# Patient Record
Sex: FEMALE | Race: WHITE | NOT HISPANIC OR LATINO | Employment: FULL TIME | URBAN - METROPOLITAN AREA
[De-identification: names, ages, dates, MRNs, and addresses within clinical notes are randomized per-mention and may not be internally consistent; named-entity substitution may affect disease eponyms.]

---

## 2024-01-06 ENCOUNTER — OFFICE VISIT (OUTPATIENT)
Age: 59
End: 2024-01-06
Payer: COMMERCIAL

## 2024-01-06 VITALS
RESPIRATION RATE: 18 BRPM | OXYGEN SATURATION: 99 % | HEART RATE: 87 BPM | WEIGHT: 163 LBS | TEMPERATURE: 97.8 F | SYSTOLIC BLOOD PRESSURE: 110 MMHG | DIASTOLIC BLOOD PRESSURE: 80 MMHG

## 2024-01-06 DIAGNOSIS — B96.89 ACUTE BACTERIAL SINUSITIS: Primary | ICD-10-CM

## 2024-01-06 DIAGNOSIS — J01.90 ACUTE BACTERIAL SINUSITIS: Primary | ICD-10-CM

## 2024-01-06 PROBLEM — Z79.890 POSTMENOPAUSAL HORMONE REPLACEMENT THERAPY: Status: ACTIVE | Noted: 2023-10-20

## 2024-01-06 PROBLEM — F32.A DEPRESSIVE DISORDER: Status: ACTIVE | Noted: 2023-06-30

## 2024-01-06 PROBLEM — R01.1 MURMUR: Status: ACTIVE | Noted: 2023-10-22

## 2024-01-06 PROBLEM — K21.9 GASTROESOPHAGEAL REFLUX DISEASE WITHOUT ESOPHAGITIS: Status: ACTIVE | Noted: 2023-10-20

## 2024-01-06 PROBLEM — G89.29 CHRONIC MIDLINE LOW BACK PAIN: Status: ACTIVE | Noted: 2023-10-20

## 2024-01-06 PROBLEM — I49.3 FREQUENT PVCS: Status: ACTIVE | Noted: 2023-10-20

## 2024-01-06 PROBLEM — F41.0 GENERALIZED ANXIETY DISORDER WITH PANIC ATTACKS: Status: ACTIVE | Noted: 2023-10-20

## 2024-01-06 PROBLEM — F41.1 GENERALIZED ANXIETY DISORDER WITH PANIC ATTACKS: Status: ACTIVE | Noted: 2023-10-20

## 2024-01-06 PROBLEM — M54.50 CHRONIC MIDLINE LOW BACK PAIN: Status: ACTIVE | Noted: 2023-10-20

## 2024-01-06 PROCEDURE — G0382 LEV 3 HOSP TYPE B ED VISIT: HCPCS

## 2024-01-06 PROCEDURE — 99283 EMERGENCY DEPT VISIT LOW MDM: CPT

## 2024-01-06 RX ORDER — FAMOTIDINE 40 MG/1
40 TABLET, FILM COATED ORAL DAILY
COMMUNITY

## 2024-01-06 RX ORDER — PANTOPRAZOLE SODIUM 40 MG/1
40 TABLET, DELAYED RELEASE ORAL DAILY
COMMUNITY

## 2024-01-06 RX ORDER — LORAZEPAM 0.5 MG/1
0.5 TABLET ORAL
COMMUNITY
Start: 2023-10-20

## 2024-01-06 RX ORDER — AMOXICILLIN AND CLAVULANATE POTASSIUM 875; 125 MG/1; MG/1
1 TABLET, FILM COATED ORAL EVERY 12 HOURS SCHEDULED
Qty: 14 TABLET | Refills: 0 | Status: SHIPPED | OUTPATIENT
Start: 2024-01-06 | End: 2024-01-13

## 2024-01-06 RX ORDER — PREDNISONE 20 MG/1
20 TABLET ORAL 2 TIMES DAILY
Qty: 10 TABLET | Refills: 0 | Status: SHIPPED | OUTPATIENT
Start: 2024-01-06 | End: 2024-01-11

## 2024-01-06 RX ORDER — AMITRIPTYLINE HYDROCHLORIDE 50 MG/1
50 TABLET, FILM COATED ORAL
COMMUNITY
Start: 2023-12-11

## 2024-01-06 RX ORDER — CARBAMAZEPINE 200 MG/1
200 TABLET, EXTENDED RELEASE ORAL
COMMUNITY

## 2024-01-06 RX ORDER — PREGABALIN 25 MG/1
25 CAPSULE ORAL
COMMUNITY
Start: 2023-12-11

## 2024-01-06 RX ORDER — SERTRALINE HYDROCHLORIDE 100 MG/1
100 TABLET, FILM COATED ORAL DAILY
COMMUNITY

## 2024-01-06 RX ORDER — MELOXICAM 15 MG/1
15 TABLET ORAL DAILY
COMMUNITY

## 2024-01-06 RX ORDER — ESTRADIOL 2 MG/1
1 TABLET ORAL DAILY
COMMUNITY
Start: 2023-10-20

## 2024-01-06 NOTE — PROGRESS NOTES
[facility]        NAME: Courtney Anguiano is a 58 y.o. female  : 1965    MRN: [id number]  DATE: 2024  TIME: 12:38 PM    Assessment and Plan   Acute bacterial sinusitis [J01.90, B96.89]  1. Acute bacterial sinusitis  amoxicillin-clavulanate (AUGMENTIN) 875-125 mg per tablet    predniSONE 20 mg tablet            Patient Instructions   Take antibiotics and steroids as prescribed  For decongestion, Over The Counter medications:  Mucinex-DM   Nasal corticosteroid: examples are Flonase or Nasacort.  Nasal saline irrigation  Humidified air  Warm moist air such as running the shower for several minutes on warm/hot for the steam. Sit in bathroom for several minutes to take deep breaths.  Vicks Vapor Rub  For Cough or sore throat:  Salt water gurgle  Honey  Chloraseptic spray  Throat lozenges  Over the Counter Tylenol or Ibuprofen  Follow up with PCP in 3-5 days if symptoms not improving.  Proceed to ER if symptoms worsen.      Chief Complaint     Chief Complaint   Patient presents with   • Cough     Cough, nasal congestion, bilateral ear pressure X 2 days         History of Present Illness       Cough, nasal congestion with purulent drainage and sinus pain, bilateral ear pressure, and also chest congestion and tightness for 2 days.        Review of Systems   Review of Systems   HENT:  Positive for congestion, sinus pressure, sinus pain and sore throat.    Respiratory:  Positive for cough, chest tightness and shortness of breath.    Neurological:  Positive for headaches. Negative for dizziness and weakness.         Current Medications       Current Outpatient Medications:   •  amitriptyline (ELAVIL) 50 mg tablet, Take 50 mg by mouth, Disp: , Rfl:   •  amoxicillin-clavulanate (AUGMENTIN) 875-125 mg per tablet, Take 1 tablet by mouth every 12 (twelve) hours for 7 days, Disp: 14 tablet, Rfl: 0  •  estradiol (ESTRACE) 2 MG tablet, Take 1 mg by mouth daily, Disp: , Rfl:   •  LORazepam (ATIVAN) 0.5 mg  tablet, Take 0.5 mg by mouth, Disp: , Rfl:   •  predniSONE 20 mg tablet, Take 1 tablet (20 mg total) by mouth 2 (two) times a day for 5 days, Disp: 10 tablet, Rfl: 0  •  pregabalin (LYRICA) 25 mg capsule, Take 25 mg by mouth, Disp: , Rfl:   •  carBAMazepine (TEGretol XR) 200 mg 12 hr tablet, Take 200 mg by mouth, Disp: , Rfl:   •  famotidine (PEPCID) 40 MG tablet, Take 40 mg by mouth daily, Disp: , Rfl:   •  meloxicam (MOBIC) 15 mg tablet, Take 15 mg by mouth daily, Disp: , Rfl:   •  pantoprazole (PROTONIX) 40 mg tablet, Take 40 mg by mouth daily, Disp: , Rfl:   •  sertraline (ZOLOFT) 100 mg tablet, Take 100 mg by mouth daily, Disp: , Rfl:     Current Allergies     Allergies as of 01/06/2024 - Reviewed 01/06/2024   Allergen Reaction Noted   • Doxycycline Shortness Of Breath 12/06/2021   • Medical tape Shortness Of Breath 10/18/2023   • Bee venom Vomiting 10/18/2023   • Pollen extract Allergic Rhinitis 10/18/2023            The following portions of the patient's history were reviewed and updated as appropriate: allergies, current medications, past family history, past medical history, past social history, past surgical history and problem list.     History reviewed. No pertinent past medical history.    History reviewed. No pertinent surgical history.    History reviewed. No pertinent family history.      Medications have been verified.        Objective   /80 (BP Location: Right arm, Patient Position: Sitting, Cuff Size: Adult)   Pulse 87   Temp 97.8 °F (36.6 °C) (Oral)   Resp 18   Wt 73.9 kg (163 lb)   SpO2 99%   No LMP recorded. Patient has had a hysterectomy.       Physical Exam     Physical Exam  Vitals and nursing note reviewed.   Constitutional:       Appearance: Normal appearance.   HENT:      Head: Normocephalic and atraumatic.      Right Ear: A middle ear effusion is present.      Left Ear: A middle ear effusion is present.      Nose:      Right Sinus: Maxillary sinus tenderness and frontal sinus  tenderness present.      Left Sinus: Maxillary sinus tenderness and frontal sinus tenderness present.      Mouth/Throat:      Mouth: Mucous membranes are moist.      Pharynx: No oropharyngeal exudate.   Cardiovascular:      Pulses: Normal pulses.   Pulmonary:      Effort: Pulmonary effort is normal.      Breath sounds: Decreased air movement present.   Skin:     General: Skin is warm and dry.      Capillary Refill: Capillary refill takes less than 2 seconds.   Neurological:      General: No focal deficit present.      Mental Status: She is alert and oriented to person, place, and time. Mental status is at baseline.      Sensory: No sensory deficit.      Motor: No weakness.   Psychiatric:         Mood and Affect: Mood normal.         Behavior: Behavior normal.         Thought Content: Thought content normal.

## 2024-01-06 NOTE — PATIENT INSTRUCTIONS
Take antibiotics and steroids as prescribed  For decongestion, Over The Counter medications:  Mucinex-DM   Nasal corticosteroid: examples are Flonase or Nasacort.  Nasal saline irrigation  Humidified air  Warm moist air such as running the shower for several minutes on warm/hot for the steam. Sit in bathroom for several minutes to take deep breaths.  Vicks Vapor Rub  For Cough or sore throat:  Salt water gurgle  Honey  Chloraseptic spray  Throat lozenges  Over the Counter Tylenol or Ibuprofen  Follow up with PCP in 3-5 days if symptoms not improving.  Proceed to ER if symptoms worsen.